# Patient Record
Sex: MALE | Race: BLACK OR AFRICAN AMERICAN | URBAN - METROPOLITAN AREA
[De-identification: names, ages, dates, MRNs, and addresses within clinical notes are randomized per-mention and may not be internally consistent; named-entity substitution may affect disease eponyms.]

---

## 2020-10-15 ENCOUNTER — NEW PATIENT (OUTPATIENT)
Dept: URBAN - METROPOLITAN AREA CLINIC 64 | Facility: CLINIC | Age: 75
End: 2020-10-15
Payer: MEDICARE

## 2020-10-15 DIAGNOSIS — H40.023 OPEN ANGLE WITH BORDERLINE FINDINGS, HIGH RISK, BILATERAL: Primary | ICD-10-CM

## 2020-10-15 PROCEDURE — 99204 OFFICE O/P NEW MOD 45 MIN: CPT | Performed by: STUDENT IN AN ORGANIZED HEALTH CARE EDUCATION/TRAINING PROGRAM

## 2020-10-15 ASSESSMENT — KERATOMETRY
OD: 39.16
OS: 39.48

## 2020-10-15 ASSESSMENT — VISUAL ACUITY
OD: 20/20
OS: 20/20

## 2020-10-15 ASSESSMENT — INTRAOCULAR PRESSURE
OS: 12
OD: 12

## 2021-02-04 ENCOUNTER — FOLLOW UP ESTABLISHED (OUTPATIENT)
Dept: URBAN - METROPOLITAN AREA CLINIC 64 | Facility: CLINIC | Age: 76
End: 2021-02-04
Payer: MEDICARE

## 2021-02-04 DIAGNOSIS — H25.813 COMBINED FORMS OF AGE-RELATED CATARACT, BILATERAL: Primary | ICD-10-CM

## 2021-02-04 PROCEDURE — 92083 EXTENDED VISUAL FIELD XM: CPT | Performed by: STUDENT IN AN ORGANIZED HEALTH CARE EDUCATION/TRAINING PROGRAM

## 2021-02-04 PROCEDURE — 99213 OFFICE O/P EST LOW 20 MIN: CPT | Performed by: STUDENT IN AN ORGANIZED HEALTH CARE EDUCATION/TRAINING PROGRAM

## 2021-02-04 PROCEDURE — 92020 GONIOSCOPY: CPT | Performed by: STUDENT IN AN ORGANIZED HEALTH CARE EDUCATION/TRAINING PROGRAM

## 2021-02-04 ASSESSMENT — INTRAOCULAR PRESSURE
OS: 18
OD: 16

## 2021-05-04 ENCOUNTER — OFFICE VISIT (OUTPATIENT)
Dept: URBAN - METROPOLITAN AREA CLINIC 64 | Facility: CLINIC | Age: 76
End: 2021-05-04
Payer: MEDICARE

## 2021-05-04 PROCEDURE — 99213 OFFICE O/P EST LOW 20 MIN: CPT | Performed by: STUDENT IN AN ORGANIZED HEALTH CARE EDUCATION/TRAINING PROGRAM

## 2021-05-04 PROCEDURE — 92083 EXTENDED VISUAL FIELD XM: CPT | Performed by: STUDENT IN AN ORGANIZED HEALTH CARE EDUCATION/TRAINING PROGRAM

## 2021-05-04 ASSESSMENT — INTRAOCULAR PRESSURE
OD: 13
OS: 13

## 2021-05-04 NOTE — IMPRESSION/PLAN
Impression: Combined forms of age-related cataract, bilateral Plan: 20/20 vision today, repeat Mrx at annual complete exam.

## 2021-05-04 NOTE — IMPRESSION/PLAN
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 Plan: Today IOP is in low teens, HVF with inferior arcuates and good test reliability. Recommend repeat testing in 6-12 months for comparison. OCT RNFL today with good global indices. Recommend IOP check in 4 months.

## 2021-10-19 ENCOUNTER — OFFICE VISIT (OUTPATIENT)
Dept: URBAN - METROPOLITAN AREA CLINIC 64 | Facility: CLINIC | Age: 76
End: 2021-10-19
Payer: MEDICARE

## 2021-10-19 DIAGNOSIS — H35.372 PUCKERING OF MACULA, LEFT EYE: ICD-10-CM

## 2021-10-19 DIAGNOSIS — H43.812 VITREOUS DEGENERATION, LEFT EYE: ICD-10-CM

## 2021-10-19 DIAGNOSIS — H40.1332 PIGMENTARY GLAUCOMA, BILATERAL, MODERATE STAGE: Primary | ICD-10-CM

## 2021-10-19 PROCEDURE — 99212 OFFICE O/P EST SF 10 MIN: CPT | Performed by: STUDENT IN AN ORGANIZED HEALTH CARE EDUCATION/TRAINING PROGRAM

## 2021-10-19 ASSESSMENT — INTRAOCULAR PRESSURE
OD: 16
OS: 16

## 2021-10-19 ASSESSMENT — VISUAL ACUITY
OS: 20/20
OD: 20/20

## 2021-10-19 ASSESSMENT — KERATOMETRY
OD: 39.13
OS: 39.34

## 2021-10-19 NOTE — IMPRESSION/PLAN
Impression: Combined forms of age-related cataract, bilateral: H25.813.  Plan: Send to [facility] for glasses/mrx in 2 weeks, Follow up 6 months with [facility]

## 2021-10-19 NOTE — IMPRESSION/PLAN
Impression: Pigmentary glaucoma, bilateral, moderate stage: J44.2999.  Plan: Stable IOP today, Repeat HVF 24-2 and OCT RNFL at 6 month visit and decide if need to start topicals or perform CE_IOL + MiGS

## 2022-02-08 ENCOUNTER — OFFICE VISIT (OUTPATIENT)
Dept: URBAN - METROPOLITAN AREA CLINIC 64 | Facility: CLINIC | Age: 77
End: 2022-02-08
Payer: MEDICARE

## 2022-02-08 DIAGNOSIS — H11.30 SUBCONJUNCTIVAL HEMORRHAGE: Primary | ICD-10-CM

## 2022-02-08 PROCEDURE — 99203 OFFICE O/P NEW LOW 30 MIN: CPT | Performed by: OPTOMETRIST

## 2022-02-08 RX ORDER — ATORVASTATIN CALCIUM 80 MG/1
80 MG TABLET, FILM COATED ORAL
Qty: 0 | Refills: 0 | Status: ACTIVE
Start: 2022-02-08

## 2022-02-08 ASSESSMENT — INTRAOCULAR PRESSURE
OS: 11
OD: 14

## 2022-02-08 NOTE — IMPRESSION/PLAN
Impression: Subconjunctival hemorrhage: H11.30. Plan: Discussed. Will take several weeks to resolve. No treatment required.

## 2022-05-06 ENCOUNTER — OFFICE VISIT (OUTPATIENT)
Dept: URBAN - METROPOLITAN AREA CLINIC 64 | Facility: CLINIC | Age: 77
End: 2022-05-06
Payer: MEDICARE

## 2022-05-06 DIAGNOSIS — H35.372 PUCKERING OF MACULA, LEFT EYE: ICD-10-CM

## 2022-05-06 DIAGNOSIS — H25.13 AGE-RELATED NUCLEAR CATARACT, BILATERAL: Primary | ICD-10-CM

## 2022-05-06 PROCEDURE — 92014 COMPRE OPH EXAM EST PT 1/>: CPT | Performed by: OPTOMETRIST

## 2022-05-06 PROCEDURE — 92134 CPTRZ OPH DX IMG PST SGM RTA: CPT | Performed by: OPTOMETRIST

## 2022-05-06 ASSESSMENT — KERATOMETRY
OD: 39.06
OS: 39.31

## 2022-05-06 ASSESSMENT — INTRAOCULAR PRESSURE
OS: 19
OD: 17

## 2022-05-06 ASSESSMENT — VISUAL ACUITY
OS: 20/20
OD: 20/20

## 2022-05-06 NOTE — IMPRESSION/PLAN
Impression: Age-related nuclear cataract, bilateral: H25.13. Plan: He is interested in cataract surgery. Pt. ed. that cataracts may not be bad enough for the [employer] company to pay yet. He would like to consult with the surgeon. Glasses Rxs updated in case they decided to wait on cat sx. Consult with Dr. Estefany Guthrie. Candidate for all upgrades.

## 2022-05-19 ENCOUNTER — OFFICE VISIT (OUTPATIENT)
Dept: URBAN - METROPOLITAN AREA CLINIC 64 | Facility: CLINIC | Age: 77
End: 2022-05-19
Payer: MEDICARE

## 2022-05-19 DIAGNOSIS — H16.223 KERATOCONJUNCTIVITIS SICCA, BILATERAL: ICD-10-CM

## 2022-05-19 DIAGNOSIS — H43.812 VITREOUS DEGENERATION, LEFT EYE: ICD-10-CM

## 2022-05-19 DIAGNOSIS — H40.1332 PIGMENTARY GLAUCOMA, BILATERAL, MODERATE STAGE: ICD-10-CM

## 2022-05-19 PROCEDURE — 99213 OFFICE O/P EST LOW 20 MIN: CPT | Performed by: STUDENT IN AN ORGANIZED HEALTH CARE EDUCATION/TRAINING PROGRAM

## 2022-05-19 ASSESSMENT — INTRAOCULAR PRESSURE
OS: 14
OD: 14

## 2022-05-19 NOTE — IMPRESSION/PLAN
Impression: Pigmentary glaucoma, bilateral, moderate stage: L47.4797. Plan: Stable IOP today, Repeat HVF 24-2 and OCT RNFL 1 year and decide if need to start topicals.

## 2022-05-19 NOTE — IMPRESSION/PLAN
Impression: Age-related nuclear cataract, bilateral: H25.13. Plan: No treatment currently recommended due to level of vision. Patient will monitor vision changes and contact us with any decrease in vision. Will re-evaluate cataract on return visit.

## 2022-05-19 NOTE — IMPRESSION/PLAN
Impression: Keratoconjunctivitis sicca, bilateral: C36.231. Plan: This can result in decrease or fluctuating vision. Reviewed with patient there is no cure and daily maintenance is needed. Recommend artificial tears qid or more ou. Artificial tears can be purchased OTC. Discussed with patient artificial tears may be used as often as needed.

## 2023-09-13 ENCOUNTER — APPOINTMENT (RX ONLY)
Dept: URBAN - METROPOLITAN AREA CLINIC 168 | Facility: CLINIC | Age: 78
Setting detail: DERMATOLOGY
End: 2023-09-13

## 2023-09-13 DIAGNOSIS — D69.2 OTHER NONTHROMBOCYTOPENIC PURPURA: ICD-10-CM

## 2023-09-13 DIAGNOSIS — R21 RASH AND OTHER NONSPECIFIC SKIN ERUPTION: ICD-10-CM

## 2023-09-13 PROCEDURE — ? COUNSELING

## 2023-09-13 PROCEDURE — ? ADDITIONAL NOTES

## 2023-09-13 PROCEDURE — 99202 OFFICE O/P NEW SF 15 MIN: CPT

## 2023-09-13 NOTE — PROCEDURE: ADDITIONAL NOTES
Render Risk Assessment In Note?: no
Detail Level: Detailed
Additional Notes: ***\\n-Pt has been seen by Dr. Louie Gatica at [facility]. Recent Dx of Eczematous Dermatitis, given Clobetasol/ Betamethasone to help with symptoms although Pt says the areas are not significantly itchy. \\n-Pt has been given Prednisone course that he did not tolerate well, says that his Dr. Gatica says that next step would be to suppress his immune system. Given lack of symptoms and evidence of solar purpura, with areas of open wounds that are now healing normally, recommended proper wound care and discontinuation of Rx therapy. \\n-Pt inquiring about the possibility of the affect of Agent Orange, assured Pt solar purpuras are a consequence of sun exposure over time. \\n-F/u PRN, Pt has another derm in [place] that handles his routine skin checks.\\n\\nDiscussed with the patient that the only thing I see on exam is solar purpura on the L>R arms. No dermatitis or rash present. Patient denies itch.